# Patient Record
Sex: FEMALE | Race: WHITE | Employment: FULL TIME | URBAN - METROPOLITAN AREA
[De-identification: names, ages, dates, MRNs, and addresses within clinical notes are randomized per-mention and may not be internally consistent; named-entity substitution may affect disease eponyms.]

---

## 2017-04-17 RX ORDER — GABAPENTIN 100 MG/1
300 CAPSULE ORAL NIGHTLY
COMMUNITY
End: 2017-07-20 | Stop reason: ALTCHOICE

## 2017-04-17 RX ORDER — OMEGA-3 FATTY ACIDS/FISH OIL 300-1000MG
1 CAPSULE ORAL AS NEEDED
Status: ON HOLD | COMMUNITY
End: 2017-05-04

## 2017-05-03 NOTE — H&P
[address]    PATIENT'S NAME: Domi Medrano   ATTENDING PHYSICIAN: Levy Gonzalez M.D.    PATIENT ACCOUNT#:   [de-identified]    LOCATION:  OR   [facility]  MEDICAL RECORD #:   [id number]       YOB: 1980  ADMISSION DATE:       05/04/2017    HISTOR

## 2017-05-04 ENCOUNTER — ANESTHESIA (OUTPATIENT)
Dept: SURGERY | Facility: HOSPITAL | Age: 37
End: 2017-05-04

## 2017-05-04 ENCOUNTER — HOSPITAL ENCOUNTER (OUTPATIENT)
Facility: HOSPITAL | Age: 37
Setting detail: HOSPITAL OUTPATIENT SURGERY
Discharge: HOME | End: 2017-05-04
Attending: OTOLARYNGOLOGY | Admitting: OTOLARYNGOLOGY
Payer: COMMERCIAL

## 2017-05-04 ENCOUNTER — SURGERY (OUTPATIENT)
Age: 37
End: 2017-05-04

## 2017-05-04 ENCOUNTER — ANESTHESIA EVENT (OUTPATIENT)
Dept: SURGERY | Facility: HOSPITAL | Age: 37
End: 2017-05-04

## 2017-05-04 VITALS
SYSTOLIC BLOOD PRESSURE: 127 MMHG | OXYGEN SATURATION: 95 % | HEART RATE: 59 BPM | WEIGHT: 150.13 LBS | RESPIRATION RATE: 16 BRPM | DIASTOLIC BLOOD PRESSURE: 78 MMHG | TEMPERATURE: 98 F | HEIGHT: 65 IN | BODY MASS INDEX: 25.01 KG/M2

## 2017-05-04 PROCEDURE — 81025 URINE PREGNANCY TEST: CPT | Performed by: OTOLARYNGOLOGY

## 2017-05-04 PROCEDURE — 0CTPXZZ RESECTION OF TONSILS, EXTERNAL APPROACH: ICD-10-PCS | Performed by: OTOLARYNGOLOGY

## 2017-05-04 PROCEDURE — 88304 TISSUE EXAM BY PATHOLOGIST: CPT | Performed by: OTOLARYNGOLOGY

## 2017-05-04 RX ORDER — ONDANSETRON 4 MG/1
4 TABLET, ORALLY DISINTEGRATING ORAL EVERY 6 HOURS PRN
Status: CANCELLED | OUTPATIENT
Start: 2017-05-04

## 2017-05-04 RX ORDER — METOCLOPRAMIDE HYDROCHLORIDE 5 MG/ML
10 INJECTION INTRAMUSCULAR; INTRAVENOUS AS NEEDED
Status: DISCONTINUED | OUTPATIENT
Start: 2017-05-04 | End: 2017-05-04

## 2017-05-04 RX ORDER — ONDANSETRON 4 MG/1
4 TABLET, ORALLY DISINTEGRATING ORAL EVERY 6 HOURS PRN
Qty: 10 TABLET | Refills: 2 | Status: SHIPPED | OUTPATIENT
Start: 2017-05-04 | End: 2017-07-20 | Stop reason: ALTCHOICE

## 2017-05-04 RX ORDER — ONDANSETRON 2 MG/ML
4 INJECTION INTRAMUSCULAR; INTRAVENOUS AS NEEDED
Status: DISCONTINUED | OUTPATIENT
Start: 2017-05-04 | End: 2017-05-04

## 2017-05-04 RX ORDER — NALOXONE HYDROCHLORIDE 0.4 MG/ML
80 INJECTION, SOLUTION INTRAMUSCULAR; INTRAVENOUS; SUBCUTANEOUS AS NEEDED
Status: DISCONTINUED | OUTPATIENT
Start: 2017-05-04 | End: 2017-05-04

## 2017-05-04 RX ORDER — MEPERIDINE HYDROCHLORIDE 25 MG/ML
12.5 INJECTION INTRAMUSCULAR; INTRAVENOUS; SUBCUTANEOUS AS NEEDED
Status: DISCONTINUED | OUTPATIENT
Start: 2017-05-04 | End: 2017-05-04

## 2017-05-04 RX ORDER — DEXAMETHASONE SODIUM PHOSPHATE 4 MG/ML
4 VIAL (ML) INJECTION AS NEEDED
Status: DISCONTINUED | OUTPATIENT
Start: 2017-05-04 | End: 2017-05-04

## 2017-05-04 RX ORDER — HYDROCODONE BITARTRATE AND ACETAMINOPHEN 10; 325 MG/15ML; MG/15ML
10 SOLUTION ORAL EVERY 4 HOURS PRN
Qty: 473 ML | Refills: 0 | Status: SHIPPED | OUTPATIENT
Start: 2017-05-04 | End: 2017-05-11

## 2017-05-04 RX ORDER — SCOLOPAMINE TRANSDERMAL SYSTEM 1 MG/1
1 PATCH, EXTENDED RELEASE TRANSDERMAL ONCE
Status: DISCONTINUED | OUTPATIENT
Start: 2017-05-04 | End: 2017-05-04

## 2017-05-04 RX ORDER — SODIUM CHLORIDE, SODIUM LACTATE, POTASSIUM CHLORIDE, CALCIUM CHLORIDE 600; 310; 30; 20 MG/100ML; MG/100ML; MG/100ML; MG/100ML
INJECTION, SOLUTION INTRAVENOUS CONTINUOUS
Status: DISCONTINUED | OUTPATIENT
Start: 2017-05-04 | End: 2017-05-04

## 2017-05-04 RX ORDER — BUPIVACAINE HYDROCHLORIDE AND EPINEPHRINE 5; 5 MG/ML; UG/ML
INJECTION, SOLUTION EPIDURAL; INTRACAUDAL; PERINEURAL AS NEEDED
Status: DISCONTINUED | OUTPATIENT
Start: 2017-05-04 | End: 2017-05-04 | Stop reason: HOSPADM

## 2017-05-04 RX ORDER — HYDROMORPHONE HYDROCHLORIDE 1 MG/ML
INJECTION, SOLUTION INTRAMUSCULAR; INTRAVENOUS; SUBCUTANEOUS
Status: COMPLETED
Start: 2017-05-04 | End: 2017-05-04

## 2017-05-04 RX ORDER — HYDROMORPHONE HYDROCHLORIDE 1 MG/ML
0.4 INJECTION, SOLUTION INTRAMUSCULAR; INTRAVENOUS; SUBCUTANEOUS EVERY 5 MIN PRN
Status: DISCONTINUED | OUTPATIENT
Start: 2017-05-04 | End: 2017-05-04

## 2017-05-04 NOTE — ANESTHESIA POSTPROCEDURE EVALUATION
[address] Patient Status:  Hospital Outpatient Surgery   Age/Gender 40year old female MRN [id number]   Location [facility] Attending Mellisa Hylton MD   Hosp Day # 0 PCP No primary care provider on ajn

## 2017-05-04 NOTE — OPERATIVE REPORT
[address]    PATIENT'S NAME: Wilner Maier   ATTENDING PHYSICIAN: Cherylene Petri, M.D. OPERATING PHYSICIAN: Cherylene Petri, M.D.    PATIENT ACCOUNT#:   [de-identified]    LOCATION:  PREOPASCC  PRE ASCC 10 EDWP 10  MEDICAL RECORD #:   [id number]       DATE Jerel Ruby

## 2017-05-04 NOTE — ANESTHESIA PREPROCEDURE EVALUATION
PRE-OP EVALUATION    Patient Name: Beth Barahona    Pre-op Diagnosis: CHRONIC TONSILITIS, TONSIL HYPERTROPHY    Procedure(s):  BILATERAL TONSILLECTOMY    Surgeon(s) and Role:     Dipika Stout MD - Primary    Pre-op vitals reviewed.         Body mass in

## 2017-05-04 NOTE — BRIEF OP NOTE
Pre-Operative Diagnosis: CHRONIC TONSILITIS, TONSIL HYPERTROPHY     Post-Operative Diagnosis: CHRONIC TONSILITIS, TONSIL HYPERTROPHY     Procedure Performed:   Procedure(s):  BILATERAL TONSILLECTOMY    Surgeon(s) and Role:     Beth Mendoza MD - Sylvester Resendiz

## 2017-07-20 ENCOUNTER — OFFICE VISIT (OUTPATIENT)
Dept: FAMILY MEDICINE CLINIC | Facility: CLINIC | Age: 37
End: 2017-07-20

## 2017-07-20 VITALS
OXYGEN SATURATION: 98 % | SYSTOLIC BLOOD PRESSURE: 108 MMHG | DIASTOLIC BLOOD PRESSURE: 64 MMHG | HEART RATE: 80 BPM | RESPIRATION RATE: 18 BRPM | TEMPERATURE: 98 F

## 2017-07-20 DIAGNOSIS — L74.0 HEAT RASH: Primary | ICD-10-CM

## 2017-07-20 DIAGNOSIS — L73.9 FOLLICULITIS: ICD-10-CM

## 2017-07-20 PROCEDURE — 99202 OFFICE O/P NEW SF 15 MIN: CPT | Performed by: NURSE PRACTITIONER

## 2017-07-20 RX ORDER — MUPIROCIN CALCIUM 20 MG/G
1 CREAM TOPICAL DAILY
Qty: 15 G | Refills: 1 | Status: SHIPPED | OUTPATIENT
Start: 2017-07-20 | End: 2017-08-03

## 2017-07-20 RX ORDER — METHYLPREDNISOLONE 4 MG/1
TABLET ORAL
Qty: 1 KIT | Refills: 0 | Status: SHIPPED | OUTPATIENT
Start: 2017-07-20 | End: 2018-09-20 | Stop reason: ALTCHOICE

## 2017-07-20 NOTE — PROGRESS NOTES
CHIEF COMPLAINT:   Patient presents with:  Rash: heat rash         HPI:   Farzaneh Victoria is a 40year old female who presents for evaluation of a rash. Per patient rash started in the past 2 months.  Reports is a heat rash, works in [employer] and sweats MUSC/SKEL: Denies joint swelling or joint stiffness. GI: Denies abdominal pain, N/V/C/D. NEURO: Denies abnormal sensation, tingling of the skin, or numbness.       EXAM:   /64   Pulse 80   Temp 98.2 °F (36.8 °C) (Oral)   Resp 18   SpO2 98%   GENERAL Folliculitis is an infection of a hair follicle. A hair follicle is the little pocket where a hair grows out of the skin. Bacteria normally live on the skin. But sometimes bacteria can get trapped in a follicle and cause inflammation.  This causes a bumpy r · Change sheets and blankets if they are soiled by pus. Wash all clothes, towels, sheets, and cloth diapers in soap and hot water. Do not share clothes, towels, or sheets with other family members. · Do not soak the sores in bath water.  This can spread in · Dry skin, which is often seen during the winter months and in older people  How can I control itching and skin damage? · Take soothing lukewarm baths in a colloidal oatmeal product. You can buy this at the drugstore.   · Do your best not to scratch. Clip · Rash covers your face, genitals, or most of your body  · Crusty sores or red rings that begin to spread  · You were exposed to someone who has a contagious rash, such as scabies or lice.   · Red bull’s-eye rash with a white center (a sign of Lyme disease)

## 2017-07-20 NOTE — PATIENT INSTRUCTIONS
Folliculitis  Folliculitis is an infection of a hair follicle. A hair follicle is the little pocket where a hair grows out of the skin. Bacteria normally live on the skin. But sometimes bacteria can get trapped in a follicle and cause inflammation.  This · Change sheets and blankets if they are soiled by pus. Wash all clothes, towels, sheets, and cloth diapers in soap and hot water. Do not share clothes, towels, or sheets with other family members. · Do not soak the sores in bath water.  This can spread in · Dry skin, which is often seen during the winter months and in older people  How can I control itching and skin damage? · Take soothing lukewarm baths in a colloidal oatmeal product. You can buy this at the drugstore.   · Do your best not to scratch. Clip · Rash covers your face, genitals, or most of your body  · Crusty sores or red rings that begin to spread  · You were exposed to someone who has a contagious rash, such as scabies or lice.   · Red bull’s-eye rash with a white center (a sign of Lyme disease)

## 2018-05-19 ENCOUNTER — APPOINTMENT (OUTPATIENT)
Dept: GENERAL RADIOLOGY | Age: 38
End: 2018-05-19
Attending: NURSE PRACTITIONER
Payer: COMMERCIAL

## 2018-05-19 ENCOUNTER — HOSPITAL ENCOUNTER (OUTPATIENT)
Age: 38
Discharge: HOME | End: 2018-05-19
Payer: COMMERCIAL

## 2018-05-19 VITALS
WEIGHT: 130 LBS | BODY MASS INDEX: 21.66 KG/M2 | SYSTOLIC BLOOD PRESSURE: 123 MMHG | TEMPERATURE: 98 F | HEART RATE: 77 BPM | DIASTOLIC BLOOD PRESSURE: 80 MMHG | RESPIRATION RATE: 18 BRPM | HEIGHT: 65 IN | OXYGEN SATURATION: 99 %

## 2018-05-19 DIAGNOSIS — M79.672 LEFT FOOT PAIN: Primary | ICD-10-CM

## 2018-05-19 PROCEDURE — 99213 OFFICE O/P EST LOW 20 MIN: CPT

## 2018-05-19 PROCEDURE — 99203 OFFICE O/P NEW LOW 30 MIN: CPT

## 2018-05-19 PROCEDURE — 73630 X-RAY EXAM OF FOOT: CPT | Performed by: NURSE PRACTITIONER

## 2018-05-19 NOTE — ED INITIAL ASSESSMENT (HPI)
Patient is not sure what happened but she has had pain on and off all week to the top of her left foot. Starting last night the pain got much worse.

## 2018-05-19 NOTE — ED NOTES
Patient states pain is a 10. She is smiling and laughing with visitor in the room. Patient declined medication for pain. She was given an ice pack following xray.

## 2018-05-19 NOTE — ED PROVIDER NOTES
Patient Seen in: [address] [facility]    History   Patient presents with: Foot Pain    Stated Complaint: left foot pain    16-year-old female presents today with complaints of left foot pain.   States does not remember any particular injuries distress. Neck: Normal range of motion. Pulmonary/Chest: Effort normal.   Musculoskeletal:   Pain with palpation to the dorsal aspect of the left foot. No gross deformity swelling noted. Skin warm dry normal color and intact.   Pulses are palpable cap degenerative spurring a post avulsion fracture to the first metatarsal and first cuneiform. Will place an Ace wrap and give crutches and have follow-up with orthopedics. Patient verbalized understanding agree with plan of care.   Rice instructions were gi

## 2018-09-20 ENCOUNTER — OFFICE VISIT (OUTPATIENT)
Dept: FAMILY MEDICINE CLINIC | Facility: CLINIC | Age: 38
End: 2018-09-20
Payer: COMMERCIAL

## 2018-09-20 VITALS
WEIGHT: 135 LBS | DIASTOLIC BLOOD PRESSURE: 70 MMHG | TEMPERATURE: 99 F | HEIGHT: 63.5 IN | HEART RATE: 82 BPM | BODY MASS INDEX: 23.62 KG/M2 | SYSTOLIC BLOOD PRESSURE: 108 MMHG | RESPIRATION RATE: 16 BRPM

## 2018-09-20 DIAGNOSIS — G43.109 MIGRAINE WITH AURA AND WITHOUT STATUS MIGRAINOSUS, NOT INTRACTABLE: Primary | ICD-10-CM

## 2018-09-20 PROCEDURE — 99203 OFFICE O/P NEW LOW 30 MIN: CPT | Performed by: FAMILY MEDICINE

## 2018-09-20 RX ORDER — SUMATRIPTAN 50 MG/1
50 TABLET, FILM COATED ORAL EVERY 2 HOUR PRN
Qty: 10 TABLET | Refills: 0 | Status: SHIPPED | OUTPATIENT
Start: 2018-09-20 | End: 2020-10-15 | Stop reason: ALTCHOICE

## 2018-09-20 NOTE — PROGRESS NOTES
Anne Salgado is a 41year old female. Patient presents with:  Establish Care: migraines per pt      HPI:   Issues with migraines lately. Was seen at [facility]. She was given something IV and some morphine and that helped.  She hasn't had 108/64  05/04/17 : 127/78      Wt Readings from Last 6 Encounters:  09/20/18 : 135 lb  05/19/18 : 130 lb  05/04/17 : 150 lb 2.1 oz      REVIEW OF SYSTEMS:   GENERAL HEALTH: feels well no complaints other than above   SKIN: denies any unusual skin lesions o understanding of these issues and agrees to the plan.

## 2019-07-11 ENCOUNTER — OFFICE VISIT (OUTPATIENT)
Dept: FAMILY MEDICINE CLINIC | Facility: CLINIC | Age: 39
End: 2019-07-11
Payer: COMMERCIAL

## 2019-07-11 VITALS
HEART RATE: 68 BPM | BODY MASS INDEX: 24.79 KG/M2 | SYSTOLIC BLOOD PRESSURE: 112 MMHG | TEMPERATURE: 99 F | DIASTOLIC BLOOD PRESSURE: 76 MMHG | HEIGHT: 64.5 IN | RESPIRATION RATE: 16 BRPM | WEIGHT: 147 LBS

## 2019-07-11 DIAGNOSIS — T63.301A SPIDER BITE WOUND, ACCIDENTAL OR UNINTENTIONAL, INITIAL ENCOUNTER: Primary | ICD-10-CM

## 2019-07-11 PROCEDURE — 99213 OFFICE O/P EST LOW 20 MIN: CPT | Performed by: FAMILY MEDICINE

## 2019-07-11 NOTE — PROGRESS NOTES
Elizabeth Zimmerman is a 46year old female. Patient presents with:  Bite: on right arm      HPI:   Woke up with a bug bite on Monday. It has not been painful, just red and swollen. Is is a little itchy. No fevers. It has been draining clear fluid, no pus. apparent distress  SKIN: no rashes,no suspicious lesions other than an insect bite on the right forearm with 1 in surrounding erythema without edema or tenderness or warmth   HEENT: atraumatic, normocephalic,ears and throat are clear  NECK: supple,no adeno

## 2020-05-11 ENCOUNTER — VIRTUAL PHONE E/M (OUTPATIENT)
Dept: FAMILY MEDICINE CLINIC | Facility: CLINIC | Age: 40
End: 2020-05-11
Payer: COMMERCIAL

## 2020-05-11 ENCOUNTER — TELEPHONE (OUTPATIENT)
Dept: FAMILY MEDICINE CLINIC | Facility: CLINIC | Age: 40
End: 2020-05-11

## 2020-05-11 DIAGNOSIS — H10.503 BLEPHAROCONJUNCTIVITIS OF BOTH EYES, UNSPECIFIED BLEPHAROCONJUNCTIVITIS TYPE: Primary | ICD-10-CM

## 2020-05-11 PROCEDURE — 99213 OFFICE O/P EST LOW 20 MIN: CPT | Performed by: FAMILY MEDICINE

## 2020-05-11 RX ORDER — CIPROFLOXACIN HYDROCHLORIDE 3.5 MG/ML
1 SOLUTION/ DROPS TOPICAL
Qty: 10 ML | Refills: 0 | Status: SHIPPED | OUTPATIENT
Start: 2020-05-11 | End: 2020-10-15 | Stop reason: ALTCHOICE

## 2020-05-11 NOTE — PROGRESS NOTES
Virtual Telephone Check-In    Roselyn Burnette verbally consents to a Virtual/Telephone Check-In visit on 05/11/20. Patient understands and accepts financial responsibility for any deductible, co-insurance and/or co-pays associated with this service.

## 2020-05-11 NOTE — TELEPHONE ENCOUNTER
Idania Mustafa verbally {consents to a [employer] on 05/11/20.   Patient understands and accepts financial responsibility for any deductible, co-insurance and/or co-pays associated with this service

## 2020-07-31 ENCOUNTER — MED REC SCAN ONLY (OUTPATIENT)
Dept: FAMILY MEDICINE CLINIC | Facility: CLINIC | Age: 40
End: 2020-07-31

## 2020-10-15 ENCOUNTER — OFFICE VISIT (OUTPATIENT)
Dept: FAMILY MEDICINE CLINIC | Facility: CLINIC | Age: 40
End: 2020-10-15
Payer: COMMERCIAL

## 2020-10-15 ENCOUNTER — TELEPHONE (OUTPATIENT)
Dept: FAMILY MEDICINE CLINIC | Facility: CLINIC | Age: 40
End: 2020-10-15

## 2020-10-15 VITALS
HEIGHT: 64.5 IN | OXYGEN SATURATION: 99 % | WEIGHT: 154 LBS | BODY MASS INDEX: 25.97 KG/M2 | SYSTOLIC BLOOD PRESSURE: 124 MMHG | DIASTOLIC BLOOD PRESSURE: 86 MMHG | TEMPERATURE: 98 F | RESPIRATION RATE: 16 BRPM | HEART RATE: 81 BPM

## 2020-10-15 DIAGNOSIS — R22.31 LOCALIZED SWELLING ON RIGHT HAND: ICD-10-CM

## 2020-10-15 DIAGNOSIS — G56.01 CARPAL TUNNEL SYNDROME OF RIGHT WRIST: Primary | ICD-10-CM

## 2020-10-15 PROCEDURE — 3008F BODY MASS INDEX DOCD: CPT | Performed by: FAMILY MEDICINE

## 2020-10-15 PROCEDURE — 3079F DIAST BP 80-89 MM HG: CPT | Performed by: FAMILY MEDICINE

## 2020-10-15 PROCEDURE — 3074F SYST BP LT 130 MM HG: CPT | Performed by: FAMILY MEDICINE

## 2020-10-15 PROCEDURE — 99214 OFFICE O/P EST MOD 30 MIN: CPT | Performed by: FAMILY MEDICINE

## 2020-10-15 RX ORDER — NAPROXEN 500 MG/1
500 TABLET ORAL 2 TIMES DAILY WITH MEALS
Qty: 28 TABLET | Refills: 0 | Status: SHIPPED | OUTPATIENT
Start: 2020-10-15 | End: 2020-10-29

## 2020-10-15 NOTE — TELEPHONE ENCOUNTER
States at the wrist it feels like she is grinding bones. Does repetitive movement at work.   No injury  No redness   Just pain in wrist and swelling in her finger tips    Future Appointments   Date Time Provider Wilman Steven   10/15/2020  2:45 PM Glenn

## 2020-10-15 NOTE — TELEPHONE ENCOUNTER
Patient is calling because her right hand is swollen. She starting having pain last night and woke up swollen does not remember doing anything to it her wrist is very painful.  Please call

## 2020-10-15 NOTE — PROGRESS NOTES
Kirby Varner is a 45year old female. Patient presents with:  Wrist Pain: and finger swelling, no injuries      HPI:   Started with pain in wrist last night. After her shower this AM, her hand was swollen.    This has happened before about 4-5 yrs ago heartburn  NEURO: denies headaches    EXAM:   /86   Pulse 81   Temp 97.7 °F (36.5 °C) (Temporal)   Resp 16   Ht 64.5\"   Wt 154 lb (69.9 kg)   SpO2 99%   BMI 26.03 kg/m²  Body mass index is 26.03 kg/m².       GENERAL: well developed, well nourished,in

## 2021-01-13 ENCOUNTER — OFFICE VISIT (OUTPATIENT)
Dept: FAMILY MEDICINE CLINIC | Facility: CLINIC | Age: 41
End: 2021-01-13
Payer: COMMERCIAL

## 2021-01-13 VITALS
TEMPERATURE: 97 F | DIASTOLIC BLOOD PRESSURE: 80 MMHG | WEIGHT: 156.81 LBS | HEART RATE: 80 BPM | OXYGEN SATURATION: 98 % | SYSTOLIC BLOOD PRESSURE: 100 MMHG | BODY MASS INDEX: 27 KG/M2

## 2021-01-13 DIAGNOSIS — M54.50 ACUTE LEFT-SIDED LOW BACK PAIN WITHOUT SCIATICA: ICD-10-CM

## 2021-01-13 DIAGNOSIS — S39.012A LUMBAR STRAIN, INITIAL ENCOUNTER: Primary | ICD-10-CM

## 2021-01-13 PROCEDURE — 3079F DIAST BP 80-89 MM HG: CPT | Performed by: FAMILY MEDICINE

## 2021-01-13 PROCEDURE — 99214 OFFICE O/P EST MOD 30 MIN: CPT | Performed by: FAMILY MEDICINE

## 2021-01-13 PROCEDURE — 3074F SYST BP LT 130 MM HG: CPT | Performed by: FAMILY MEDICINE

## 2021-01-13 RX ORDER — CYCLOBENZAPRINE HCL 10 MG
10 TABLET ORAL 3 TIMES DAILY
Qty: 15 TABLET | Refills: 0 | Status: SHIPPED | OUTPATIENT
Start: 2021-01-13 | End: 2021-01-18

## 2021-01-13 NOTE — PATIENT INSTRUCTIONS
Rx Muscle relaxant as prescribed - this medication can cause drowsiness and dizziness and should not be used if driving or operating heavy machinery. Take only at bedtime if you are busy in the morning.    OTC Motrin / Advil 600 mg every 8 hours with food · Sudden twisting, bending, or stretching from an accident, or accidental movement  · Poor posture  · Stretching or moving wrong, without noticing pain at the time  · Poor coordination, lack of regular exercise (check with your doctor about this)  · Spinal · You can start with ice, then switch to heat. Heat (hot shower, hot bath, or heating pad) reduces pain and works well for muscle spasms. Heat can be applied to the painful area for 20 minutes then remove it for 20 minutes.  Do this over a period of 60 to 9 Kiran last reviewed this educational content on 10/1/2019  © 7886-4520 [employer] [address]. [facility] [place] [facility] [place]. All rights reserved. This information is not intended as a substitute for professional medical care.  Always foll

## 2021-01-14 NOTE — PROGRESS NOTES
[address] [facility] Office Note  Chief Complaint:   Patient presents with:  Low Back Pain      HPI:   Elizabeth Frederick is a 37year old female who presents for evaluation and management of a chief complaint of  back pain. Low back pain.  Fozia Mariluz Medication Sig Dispense Refill   • cyclobenzaprine 10 MG Oral Tab Take 1 tablet (10 mg total) by mouth 3 (three) times daily for 5 days.  15 tablet 0      Counseling given: Not Answered       REVIEW OF SYSTEMS:     All other review of systems are negative driving or operating heavy machinery. Take only at bedtime if you are busy in the morning.    OTC Motrin / Advil 600 mg every 8 hours with food X 3-5 days   Warm compresses --> back/neck stretches as discussed -->  Icing at least 3 times per day  No lifting

## 2021-02-05 ENCOUNTER — MED REC SCAN ONLY (OUTPATIENT)
Dept: FAMILY MEDICINE CLINIC | Facility: CLINIC | Age: 41
End: 2021-02-05

## 2021-03-05 ENCOUNTER — MED REC SCAN ONLY (OUTPATIENT)
Dept: FAMILY MEDICINE CLINIC | Facility: CLINIC | Age: 41
End: 2021-03-05

## 2021-04-05 ENCOUNTER — TELEPHONE (OUTPATIENT)
Dept: FAMILY MEDICINE CLINIC | Facility: CLINIC | Age: 41
End: 2021-04-05

## 2021-09-10 ENCOUNTER — TELEPHONE (OUTPATIENT)
Dept: FAMILY MEDICINE CLINIC | Facility: CLINIC | Age: 41
End: 2021-09-10

## 2021-09-10 DIAGNOSIS — N30.01 ACUTE CYSTITIS WITH HEMATURIA: Primary | ICD-10-CM

## 2021-09-10 RX ORDER — NITROFURANTOIN 25; 75 MG/1; MG/1
100 CAPSULE ORAL 2 TIMES DAILY
Qty: 14 CAPSULE | Refills: 0 | Status: SHIPPED | OUTPATIENT
Start: 2021-09-10 | End: 2021-09-17

## 2021-09-10 NOTE — TELEPHONE ENCOUNTER
As far as blood in the urine, I would recheck in about 3-4 weeks after being treated making sure she is not on her period when we check. I did send in an antibiotic for the UTI so that does not get worse over the weekend.      As far as the back pain, I c

## 2021-09-10 NOTE — TELEPHONE ENCOUNTER
Patient states she was seen at [facility] in [place] 9/9/21. Was diagnosed with UTI and sciatica  Was given norco, cyclobenzaprine and steroid. No antibiotic given  States her symptoms are improving but wants to know when she should be seen for f/u.   States sh

## 2021-09-10 NOTE — TELEPHONE ENCOUNTER
Patient notified via detailed voicemail left at cell number (ok per  HIPAA consent)    Advised to call office if any questions.   Med sent to [facility]

## 2021-09-10 NOTE — TELEPHONE ENCOUNTER
Pt called went to ER on 9/8/2021 for pain when urinating and blood in urine pt stated was given medication to treat but has not helped at all would like to be seen in office if possible     Pt would be going to work in the next 30 min its okay to leave a v

## 2021-10-11 ENCOUNTER — OFFICE VISIT (OUTPATIENT)
Dept: FAMILY MEDICINE CLINIC | Facility: CLINIC | Age: 41
End: 2021-10-11
Payer: COMMERCIAL

## 2021-10-11 VITALS
WEIGHT: 159 LBS | DIASTOLIC BLOOD PRESSURE: 80 MMHG | SYSTOLIC BLOOD PRESSURE: 120 MMHG | OXYGEN SATURATION: 99 % | BODY MASS INDEX: 27.82 KG/M2 | TEMPERATURE: 98 F | HEIGHT: 63.5 IN | HEART RATE: 74 BPM

## 2021-10-11 DIAGNOSIS — Z87.440 HISTORY OF UTI: ICD-10-CM

## 2021-10-11 DIAGNOSIS — R30.0 DYSURIA: ICD-10-CM

## 2021-10-11 DIAGNOSIS — R31.21 ASYMPTOMATIC MICROSCOPIC HEMATURIA: ICD-10-CM

## 2021-10-11 DIAGNOSIS — M62.830 SPASM OF MUSCLE OF LOWER BACK: Primary | ICD-10-CM

## 2021-10-11 PROCEDURE — 3079F DIAST BP 80-89 MM HG: CPT | Performed by: FAMILY MEDICINE

## 2021-10-11 PROCEDURE — 3008F BODY MASS INDEX DOCD: CPT | Performed by: FAMILY MEDICINE

## 2021-10-11 PROCEDURE — 3074F SYST BP LT 130 MM HG: CPT | Performed by: FAMILY MEDICINE

## 2021-10-11 PROCEDURE — 81003 URINALYSIS AUTO W/O SCOPE: CPT | Performed by: FAMILY MEDICINE

## 2021-10-11 PROCEDURE — 99214 OFFICE O/P EST MOD 30 MIN: CPT | Performed by: FAMILY MEDICINE

## 2021-10-11 NOTE — PROGRESS NOTES
Luis Alberto Love is a 37year old female. Patient presents with:  UTI      HPI:   Went to ER for sciatic pain. That was about a month ago, she had pain over a day period that got progressively worse. She was in the ER 9/9/21.  Was given steriods, cycloben (72.1 kg)   [facility] 09/16/2021   SpO2 99%   BMI 27.72 kg/m²  Body mass index is 27.72 kg/m².       GENERAL: well developed, well nourished,in no apparent distress  SKIN: no rashes,no suspicious lesions  HEENT: atraumatic, normocephalic,ears and throat are clear

## 2021-10-12 ENCOUNTER — OFFICE VISIT (OUTPATIENT)
Dept: FAMILY MEDICINE CLINIC | Facility: CLINIC | Age: 41
End: 2021-10-12
Payer: COMMERCIAL

## 2021-10-12 VITALS
HEART RATE: 90 BPM | TEMPERATURE: 99 F | DIASTOLIC BLOOD PRESSURE: 86 MMHG | BODY MASS INDEX: 27.49 KG/M2 | HEIGHT: 64 IN | SYSTOLIC BLOOD PRESSURE: 126 MMHG | WEIGHT: 161 LBS | RESPIRATION RATE: 16 BRPM | OXYGEN SATURATION: 99 %

## 2021-10-12 DIAGNOSIS — Z00.00 HEALTHY ADULT ON ROUTINE PHYSICAL EXAMINATION: Primary | ICD-10-CM

## 2021-10-12 DIAGNOSIS — Z12.31 ENCOUNTER FOR SCREENING MAMMOGRAM FOR MALIGNANT NEOPLASM OF BREAST: ICD-10-CM

## 2021-10-12 DIAGNOSIS — Z23 NEED FOR VACCINATION: ICD-10-CM

## 2021-10-12 DIAGNOSIS — Z01.419 WELL WOMAN EXAM WITH ROUTINE GYNECOLOGICAL EXAM: ICD-10-CM

## 2021-10-12 PROCEDURE — 90715 TDAP VACCINE 7 YRS/> IM: CPT | Performed by: FAMILY MEDICINE

## 2021-10-12 PROCEDURE — 3079F DIAST BP 80-89 MM HG: CPT | Performed by: FAMILY MEDICINE

## 2021-10-12 PROCEDURE — 88175 CYTOPATH C/V AUTO FLUID REDO: CPT | Performed by: FAMILY MEDICINE

## 2021-10-12 PROCEDURE — 85025 COMPLETE CBC W/AUTO DIFF WBC: CPT | Performed by: FAMILY MEDICINE

## 2021-10-12 PROCEDURE — 87625 HPV TYPES 16 & 18 ONLY: CPT | Performed by: FAMILY MEDICINE

## 2021-10-12 PROCEDURE — 3008F BODY MASS INDEX DOCD: CPT | Performed by: FAMILY MEDICINE

## 2021-10-12 PROCEDURE — 87624 HPV HI-RISK TYP POOLED RSLT: CPT | Performed by: FAMILY MEDICINE

## 2021-10-12 PROCEDURE — 80061 LIPID PANEL: CPT | Performed by: FAMILY MEDICINE

## 2021-10-12 PROCEDURE — 99396 PREV VISIT EST AGE 40-64: CPT | Performed by: FAMILY MEDICINE

## 2021-10-12 PROCEDURE — 80053 COMPREHEN METABOLIC PANEL: CPT | Performed by: FAMILY MEDICINE

## 2021-10-12 PROCEDURE — 90471 IMMUNIZATION ADMIN: CPT | Performed by: FAMILY MEDICINE

## 2021-10-12 PROCEDURE — 3074F SYST BP LT 130 MM HG: CPT | Performed by: FAMILY MEDICINE

## 2021-10-12 NOTE — PROGRESS NOTES
HPI:   Roselyn Burnette is a 37year old female who presents for a complete physical exam. Symptoms: denies discharge, itching, burning or dysuria, periods are regular. Patient complains of nothing new today.      Concerned about hematuria, microscopic onl vision  HEENT: denies nasal congestion, sinus pain or ST  LUNGS: denies shortness of breath with exertion  CARDIOVASCULAR: denies chest pain on exertion  GI: denies abdominal pain,denies heartburn  : denies dysuria, vaginal discharge or itching,periods r plan.  The patient is asked to return for CPX in 1 yr or sooner if needed.      Healthy adult on routine physical examination  (primary encounter diagnosis)  Need for vaccination  Well woman exam with routine gynecological exam  Encounter for screening mamm

## 2021-10-19 ENCOUNTER — TELEPHONE (OUTPATIENT)
Dept: FAMILY MEDICINE CLINIC | Facility: CLINIC | Age: 41
End: 2021-10-19

## 2021-10-19 DIAGNOSIS — R87.610 ATYPICAL SQUAMOUS CELLS OF UNDETERMINED SIGNIFICANCE ON CYTOLOGIC SMEAR OF CERVIX (ASC-US): Primary | ICD-10-CM

## 2021-10-19 DIAGNOSIS — R87.810 CERVICAL HIGH RISK HPV (HUMAN PAPILLOMAVIRUS) TEST POSITIVE: ICD-10-CM

## 2021-10-19 NOTE — TELEPHONE ENCOUNTER
Attempted to call pt, but it kept ringing, no answer, no VM. Need to discuss pap results. Pap showed atypical cells and her HPV is positive.  This is not cervical cancer, but this can be something that eventually can lead to cervical cancer  She needs t

## 2021-10-21 NOTE — TELEPHONE ENCOUNTER
Attempted to contact patient but after 12 rings there is no answer or voicemail.     See also result note on other labs done 10/12/21

## 2021-11-11 ENCOUNTER — TELEPHONE (OUTPATIENT)
Dept: FAMILY MEDICINE CLINIC | Facility: CLINIC | Age: 41
End: 2021-11-11

## 2021-11-11 ENCOUNTER — NURSE ONLY (OUTPATIENT)
Dept: FAMILY MEDICINE CLINIC | Facility: CLINIC | Age: 41
End: 2021-11-11
Payer: COMMERCIAL

## 2021-11-11 DIAGNOSIS — R31.21 ASYMPTOMATIC MICROSCOPIC HEMATURIA: ICD-10-CM

## 2021-11-11 PROCEDURE — 81001 URINALYSIS AUTO W/SCOPE: CPT | Performed by: FAMILY MEDICINE

## 2021-11-11 NOTE — PROGRESS NOTES
Sam Wilkerson present in office for nurse visit. Pt v/u of clean catch   Urine sample obtained    All questions/concerns addressed. Patient left in stable condition.

## 2021-11-13 ENCOUNTER — TELEPHONE (OUTPATIENT)
Dept: FAMILY MEDICINE CLINIC | Facility: CLINIC | Age: 41
End: 2021-11-13

## 2021-11-13 DIAGNOSIS — N02.9 IDIOPATHIC HEMATURIA, UNSPECIFIED WHETHER GLOMERULAR MORPHOLOGIC CHANGES PRESENT: Primary | ICD-10-CM

## 2021-11-13 NOTE — TELEPHONE ENCOUNTER
----- Message from Suly New DO sent at 11/13/2021  8:14 AM CST -----  Was she symptomatic with this? Or did she just have visible blood in her urine?  Her urine did not warrant or meet criteria for UTI, it looks like she's had blood in her urine before

## 2021-12-04 ENCOUNTER — OFFICE VISIT (OUTPATIENT)
Dept: FAMILY MEDICINE CLINIC | Facility: CLINIC | Age: 41
End: 2021-12-04
Payer: COMMERCIAL

## 2021-12-04 VITALS
DIASTOLIC BLOOD PRESSURE: 84 MMHG | TEMPERATURE: 98 F | HEART RATE: 78 BPM | OXYGEN SATURATION: 99 % | BODY MASS INDEX: 27 KG/M2 | WEIGHT: 160 LBS | SYSTOLIC BLOOD PRESSURE: 130 MMHG

## 2021-12-04 DIAGNOSIS — F41.9 ANXIETY: ICD-10-CM

## 2021-12-04 DIAGNOSIS — G47.00 INSOMNIA, UNSPECIFIED TYPE: ICD-10-CM

## 2021-12-04 DIAGNOSIS — F32.A DEPRESSION, UNSPECIFIED DEPRESSION TYPE: Primary | ICD-10-CM

## 2021-12-04 PROCEDURE — 99213 OFFICE O/P EST LOW 20 MIN: CPT | Performed by: NURSE PRACTITIONER

## 2021-12-04 PROCEDURE — 3079F DIAST BP 80-89 MM HG: CPT | Performed by: NURSE PRACTITIONER

## 2021-12-04 PROCEDURE — 3075F SYST BP GE 130 - 139MM HG: CPT | Performed by: NURSE PRACTITIONER

## 2021-12-04 RX ORDER — HYDROXYZINE HYDROCHLORIDE 25 MG/1
25 TABLET, FILM COATED ORAL 2 TIMES DAILY PRN
Qty: 60 TABLET | Refills: 0 | Status: SHIPPED | OUTPATIENT
Start: 2021-12-04

## 2021-12-04 RX ORDER — ESCITALOPRAM OXALATE 5 MG/5ML
10 SOLUTION ORAL DAILY
Qty: 300 ML | Refills: 0 | Status: SHIPPED | OUTPATIENT
Start: 2021-12-04 | End: 2021-12-30 | Stop reason: DRUGHIGH

## 2021-12-04 NOTE — PROGRESS NOTES
Subjective:   Patient ID: Mook Castellanos is a 37year old female. Patient presents to clinic today for evaluation of depression anxiety. Notes she does not have history however recent events in her marriage have caused her great distress.   Reports s Anxiety. 60 tablet 0     Allergies:   Other                   SHORTNESS OF BREATH  Ceclor [Cefaclor]       HIVES  Erythromycin            HIVES  Keflex [Cephalexin]     HIVES  Penicillins             HIVES  Septra [Sulfamethox*    HIVES  Tetracycline Take 1 tablet (25 mg total) by mouth 2 (two) times daily as needed for Anxiety.        Imaging & Referrals:  OP REFERRAL TO [facility]

## 2021-12-04 NOTE — PATIENT INSTRUCTIONS
Begin taking lexapro 5 mg daily. 25-50mg hydroxyzine at night to help with sleep. Follow up with counselor   Follow up with the office in 30 days with a condition update, sooner if there are any concerns.

## 2021-12-30 RX ORDER — ESCITALOPRAM OXALATE 5 MG/5ML
SOLUTION ORAL
Qty: 300 ML | Refills: 0 | OUTPATIENT
Start: 2021-12-30

## 2021-12-30 RX ORDER — ESCITALOPRAM OXALATE 10 MG/1
10 TABLET ORAL DAILY
Qty: 90 TABLET | Refills: 0 | Status: SHIPPED | OUTPATIENT
Start: 2021-12-30

## 2021-12-30 RX ORDER — ESCITALOPRAM OXALATE 5 MG/5ML
10 SOLUTION ORAL DAILY
Qty: 300 ML | Refills: 0 | Status: CANCELLED | OUTPATIENT
Start: 2021-12-30 | End: 2022-01-29

## 2021-12-30 NOTE — TELEPHONE ENCOUNTER
No refill protocol for this medication. Last refill: 12/04/2021 #300 ML with 0 refills  Last Visit: 12/04/2021  Next Visit: No future appointments. Forward to BETSEY Kim please advise on refills.  Please verify pt should be taking liquid form of

## 2022-09-13 ENCOUNTER — APPOINTMENT (OUTPATIENT)
Dept: RHEUMATOLOGY | Age: 42
End: 2022-09-13

## 2023-04-04 ENCOUNTER — APPOINTMENT (OUTPATIENT)
Dept: RHEUMATOLOGY | Age: 43
End: 2023-04-04

## 2023-08-04 NOTE — PROGRESS NOTES
This is a telemedicine visit with live, interactive video and audio. Patient understands and accepts financial responsibility for any deductible, co-insurance and/or co-pays associated with this service. SUBJECTIVE  For the past week or two weeks, has been having pain in left arm. Feels like a pulled muscle. Nothing has been helping the pain. Today had a meeting at work, she looked in a mirror and she has welts on her arm. A nurse thought it might be cellulitits. Feels warm to the touch and is red. No fevers. It feels hard and raised, there are some blisters on its. Doesn't remember getting bit by a bug or spider. Arm feels heavy, the whole thing hurts. Pt has carpal tunnel surgery scheduled next Thursday AM with Dr. Alvin Hansen.      HISTORY:  Past Medical History:   Diagnosis Date    Anxiety     Visual impairment     glasses/contacts      Past Surgical History:   Procedure Laterality Date          OTHER      wisdom teeth extraction    TONSILLECTOMY  2017      Family History   Problem Relation Age of Onset    Cancer Maternal Grandmother         kidney    Cancer Maternal Grandfather         bone      Social History     Socioeconomic History    Marital status:    Tobacco Use    Smoking status: Former     Types: Cigarettes     Quit date: 2012     Years since quittin.3    Smokeless tobacco: Never   Vaping Use    Vaping Use: Never used   Substance and Sexual Activity    Alcohol use: No     Comment: very rare    Drug use: No   Social History Narrative    ** Merged History Encounter **               Erythromycin            HIVES  Keflex [Cephalexin]     HIVES  Other                   SHORTNESS OF BREATH  Penicillins             HIVES  Tetracycline            HIVES  Ceclor [Cefaclor]       HIVES  Erythromycin            HIVES  Keflex [Cephalexin]     HIVES  Penicillins             HIVES  Septra [Sulfamethox*    HIVES  Septra [Sulfamethox*    UNKNOWN  Tetracycline HIVES  Ceclor [Cefaclor]       NAUSEA ONLY   Current Outpatient Medications   Medication Sig Dispense Refill    clindamycin 300 MG Oral Cap Take 1 capsule (300 mg total) by mouth 3 (three) times daily for 10 days. 30 capsule 0    ESCITALOPRAM OXALATE OR Take by mouth.      escitalopram 10 MG Oral Tab Take 1 tablet (10 mg total) by mouth daily. 90 tablet 0    hydrOXYzine 25 MG Oral Tab Take 1 tablet (25 mg total) by mouth 2 (two) times daily as needed for Anxiety. 60 tablet 0       OBJECTIVE  Physical Exam:   alert, appears stated age, and cooperative, Normocephalic, without obvious abnormality, atraumatic, Speaking in full sentences comfortably, Normal work of breathing, and + right upper arm has red, raised large patches covering most of deltoid area that are tender and hot to the touch, surface is bumpy, no draining blisters. ASSESSMENT & PLAN  Diagnoses and all orders for this visit:    Cellulitis of left upper extremity  -     clindamycin 300 MG Oral Cap; Take 1 capsule (300 mg total) by mouth 3 (three) times daily for 10 days. Treat as above given allergies. Advised to call surgeon's office, will likely need to reschedule her carpal tunnel surgery. Advised to draw a line around the red area, if redness is spreading beyond that in the next 24-48 hrs, then she needs to go to ER. She understands and agrees. Christy Cervantes, DO    Please note that the following visit was completed using two-way, real-time interactive audio and/or video communication. This has been done in good [place] to provide continuity of care in the best interest of the provider-patient relationship, due to the ongoing public health crisis/national emergency and because of restrictions of visitation. There are limitations of this visit as no physical exam could be performed. Every conscious effort was taken to allow for sufficient and adequate time.   This billing was spent on reviewing labs, medications, radiology tests and decision making. Appropriate medical decision-making and tests are ordered as detailed in the plan of care above.

## 2023-08-17 NOTE — TELEPHONE ENCOUNTER
Confirmed with patient she needs date on note changed to 8/25/23. Pain is getting better and feels she should be able to return to work without restrictions by then.

## 2023-08-17 NOTE — TELEPHONE ENCOUNTER
Patient states she has a note for restrictions at work until 8/21/23  Is asking if it can be extended until 8/25/23.   States her daughter is currently in hospital.  Patient has f/u with KE 8/25/23    Will need note faxed to Onsite nurse Lorene Alvarado at [phone]

## 2023-08-17 NOTE — TELEPHONE ENCOUNTER
Franchesca Garnica needs a return to work note, but she needs it delayed until aug 25 because her daughter is in the hospital. Call pt.

## 2023-08-25 NOTE — TELEPHONE ENCOUNTER
Will Dr Vi Jackson sent a note to Dr aHleigh Sheikh office that she is cleared for surgery, please fax.

## 2023-08-25 NOTE — TELEPHONE ENCOUNTER
Letter has been faxed to [facility] at [phone]    Patient notified and verbalized understanding.

## 2023-09-05 NOTE — TELEPHONE ENCOUNTER
Per Dr Vanessa Kessler, [facility] will not be completed until Thursday    Attempted to contact the [facility] at [phone] but after holding for 22 minutes was unable to continue holding.     Will need to retry

## 2023-09-07 NOTE — TELEPHONE ENCOUNTER
Forms have been faxed to the [facility] to scan

## 2023-12-05 NOTE — TELEPHONE ENCOUNTER
Lab Frequency Next Occurrence   [facility] DEBBI 2D+3D SCREENING BILAT (CPT=77067/21966) Once 08/25/2023     Letter mailed to patient reminding them they have outstanding orders.

## 2023-12-21 NOTE — ED INITIAL ASSESSMENT (HPI)
Pt was seen at walk in clinic today and evaluated for uri symptoms. Pt had negative covid there.   Pt sent here for further eval

## 2023-12-22 NOTE — TELEPHONE ENCOUNTER
Pt has pneumonia and has to go back to work tomorrow and will need a note to return with no restrictions, no openings, please call pt.

## 2023-12-22 NOTE — TELEPHONE ENCOUNTER
Patient states she still feels like garbage but has to go back to work tomorrow. 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 No fevers. Still coughing and short of breath. States she feels a little better than she did yesterday.   States symptoms started last weekend    Covid and flu negative

## 2023-12-22 NOTE — TELEPHONE ENCOUNTER
I'll say she can go back tomorrow, but rest today. Hydrate. Wear a mask at work if coughing (I'm guessing they wear masks anyway). Note sent to jordan.

## 2024-02-21 NOTE — TELEPHONE ENCOUNTER
Routing to covering provider- please confirm that patient is okay to take Clindamycin based on her allergy list

## 2024-02-21 NOTE — TELEPHONE ENCOUNTER
PT CALLED AND ADV JUST GOT SOME TEETH PULLED - ADV WAS PRESCRIBED CLINAMYCIN.    WOULD LIKE US TO VERIFY W/HER ALLERGIES IF SHE IS ABLE TO TAKE THIS MEDICATION    PLEASE ADV    THANK YOU

## 2024-10-26 NOTE — PROGRESS NOTES
Alejandra Garner is a 44 year old female.  Chief Complaint   Patient presents with    Medication Follow-Up     Fluoxetine follow up       HPI:   Started fluoxetine 6 weeks ago.   She loves it.   Less crying, less panic. No more racing heart.   Sleeping better.   Feels normal. So much better.   She is happy with it.   Feels like a different person, in a good way.     Has some trouble getting motivated to move this weekend, but getting it done.   Off next week so she can move into a new apartment in [place] ([place]).     ALLERGIES:  Allergies[1]      Current Outpatient Medications   Medication Sig Dispense Refill    FLUoxetine 20 MG Oral Cap Take 1 capsule (20 mg total) by mouth daily. 90 capsule 1    nitrofurantoin monohydrate macro 100 MG Oral Cap TAKE 1 CAPSULE BY MOUTH IN THE MORNING AND 1 CAPSULE IN THE EVENING. DO ALL THIS FOR 5 DAYS (Patient not taking: Reported on 2024)        Past Medical History:    Anxiety    Arthritis    Visual impairment    glasses/contacts      Social History:  Social History     Socioeconomic History    Marital status:    Tobacco Use    Smoking status: Former     Current packs/day: 0.00     Types: Cigarettes     Quit date: 2012     Years since quittin.5    Smokeless tobacco: Never   Vaping Use    Vaping status: Never Used   Substance and Sexual Activity    Alcohol use: No     Comment: very rare    Drug use: No   Other Topics Concern    Caffeine Concern No    Exercise Yes    Seat Belt Yes    Special Diet No    Stress Concern No    Weight Concern No   Social History Narrative    ** Merged History Encounter **          Social Drivers of Health      [facility], [facility]    Social Connections    Received from [facility], [facility]    Housing Stability        BP Readings from Last 6 Encounters:   10/26/24 120/78   24 128/84   23 136/77   23  134/83   11/03/23 122/86   10/06/23 118/70       Wt Readings from Last 6 Encounters:   10/26/24 150 lb (68 kg)   09/09/24 154 lb 9.6 oz (70.1 kg)   12/21/23 160 lb (72.6 kg)   12/21/23 160 lb (72.6 kg)   11/03/23 162 lb 6.4 oz (73.7 kg)   10/06/23 161 lb 2 oz (73.1 kg)       REVIEW OF SYSTEMS:   GENERAL HEALTH: feels well no complaints other than above   SKIN: denies any unusual skin lesions or rashes  RESPIRATORY: denies shortness of breath    CARDIOVASCULAR: denies chest pain on exertion  GI: denies abdominal pain and denies heartburn  NEURO: denies headaches     EXAM:   /78   Pulse 84   Temp 98.2 °F (36.8 °C) (Temporal)   Resp 14   Ht 5' 5\" (1.651 m)   Wt 150 lb (68 kg)   LMP 10/10/2024 (Exact Date)   SpO2 98%   BMI 24.96 kg/m²  Body mass index is 24.96 kg/m².      GENERAL: well developed, well nourished,in no apparent distress  SKIN: no rashes,no suspicious lesions  HEENT: atraumatic, normocephalic   NECK: supple,no adenopathy,   LUNGS: clear to auscultation  CARDIO: RRR without murmur  GI: good BS's,no masses, HSM or tenderness  EXTREMITIES: no cyanosis, clubbing or edema    ASSESSMENT AND PLAN:     Encounter Diagnosis   Name Primary?    Anxiety        Diagnoses and all orders for this visit:    Anxiety  -     FLUoxetine 20 MG Oral Cap; Take 1 capsule (20 mg total) by mouth daily.    Doing great with this med, will continue.   Follow up in 4-6 months, sooner if needed.     No orders of the defined types were placed in this encounter.              Meds & Refills for this Visit:  Requested Prescriptions     Signed Prescriptions Disp Refills    FLUoxetine 20 MG Oral Cap 90 capsule 1     Sig: Take 1 capsule (20 mg total) by mouth daily.             The patient indicates understanding of these issues and agrees to the plan.               [1]   Allergies  Allergen Reactions    Erythromycin HIVES    Keflex [Cephalexin] HIVES    Morphine OTHER (SEE COMMENTS)    Other SHORTNESS OF BREATH and HIVES     Penicillin G HYPERKALEMIA    Tetracycline HIVES    Ceclor [Cefaclor] HIVES    Penicillins HIVES    Septra [Sulfamethoxazole W/Trimethoprim] HIVES    Ceclor [Cefaclor] NAUSEA ONLY    Sulfamethoxazole UNKNOWN    Trimethoprim UNKNOWN

## 2025-04-21 NOTE — PROGRESS NOTES
Alejandra Garner is a 45 year old female.  Chief Complaint   Patient presents with    Follow - Up     Medication follow up and would like labs        HPI:   Since February she's been tired. Work has been crazy busy.   Constantly tire.   Started taking iron, but not helping.   Not lightheaded or dizzy.   Sleeping good sometimes, but sometimes mind races.     Feeling down, wants to cry all day.   Fluoxetine 20 mg helps with anxiety, but still feel it. Not as strong as it once was.   Still going through a divorce, which has been hard.   Lost another 5 lbs since last fall.   Lives on her own, sees daughter occasionally.     ALLERGIES:  Allergies[1]      Current Medications[2]   Past Medical History[3]   Social History:  Short Social Hx on File[4]     BP Readings from Last 6 Encounters:   04/21/25 136/80   10/26/24 120/78   09/09/24 128/84   12/21/23 136/77   12/21/23 134/83   11/03/23 122/86       Wt Readings from Last 6 Encounters:   04/21/25 145 lb 6.4 oz (66 kg)   10/26/24 150 lb (68 kg)   09/09/24 154 lb 9.6 oz (70.1 kg)   12/21/23 160 lb (72.6 kg)   12/21/23 160 lb (72.6 kg)   11/03/23 162 lb 6.4 oz (73.7 kg)       REVIEW OF SYSTEMS:   GENERAL HEALTH: feels well no complaints other than above   SKIN: denies any unusual skin lesions or rashes  RESPIRATORY: denies shortness of breath    CARDIOVASCULAR: denies chest pain   GI: denies abdominal pain and denies heartburn  NEURO: denies headaches    EXAM:   /80   Pulse 82   Temp 97.3 °F (36.3 °C) (Temporal)   Resp 20   Wt 145 lb 6.4 oz (66 kg)   LMP 04/17/2025 (Exact Date)   SpO2 97%   BMI 24.20 kg/m²  Body mass index is 24.2 kg/m².      GENERAL: well developed, well nourished,in no apparent distress  SKIN: no rashes,no suspicious lesions  HEENT: atraumatic, normocephalic,ears and throat are clear  NECK: supple,no adenopathy,   LUNGS: clear to auscultation, no rales or wheezing   CARDIO: RRR without murmur  GI: good BS's,no masses, HSM or  tenderness  EXTREMITIES: no cyanosis, clubbing or edema  Psych: normal affect     ASSESSMENT AND PLAN:     Encounter Diagnoses   Name Primary?    Fatigue, unspecified type Yes    Preventative health care     Anxiety     Encounter for screening mammogram for malignant neoplasm of breast        Diagnoses and all orders for this visit:    Fatigue, unspecified type  -     TSH W Reflex To Free T4; Future  -     Vitamin D; Future  -     Ferritin; Future  -     Iron And Tibc; Future  -     VENIPUNCTURE    Preventative health care  -     CBC With Differential With Platelet; Future  -     Comp Metabolic Panel (14); Future  -     Lipid Panel; Future    Anxiety  -     FLUoxetine HCl 40 MG Oral Cap; Take 1 capsule (40 mg total) by mouth daily.    Encounter for screening mammogram for malignant neoplasm of breast  -     [facility] 2D+3D SCREENING BILAT (CPT=77067/44214); Future    Increase fluoxetine to 40 mg.   Check labs as ordered.   [facility] ordered as well.     Orders Placed This Encounter   Procedures    CBC With Differential With Platelet     Standing Status:   Future     Number of Occurrences:   1     Expected Date:   4/21/2025     Expiration Date:   4/22/2026    Comp Metabolic Panel (14)     Standing Status:   Future     Number of Occurrences:   1     Expected Date:   4/21/2025     Expiration Date:   4/22/2026     [facility]: Has the patient fasted?:   Yes    Lipid Panel     Standing Status:   Future     Number of Occurrences:   1     Expected Date:   4/21/2025     Expiration Date:   4/21/2026     [facility]: Has the patient fasted?:   Yes    TSH W Reflex To Free T4     Standing Status:   Future     Number of Occurrences:   1     Expected Date:   4/21/2025     Expiration Date:   4/22/2026    Vitamin D     Standing Status:   Future     Number of Occurrences:   1     Expected Date:   4/21/2025     Expiration Date:   4/21/2026     Please pick the scenario that best fits the purpose for ordering this test:   General Screening/Vit D  deficiency (25-Hydroxy)     Release to patient:   Immediate    Ferritin     Standing Status:   Future     Number of Occurrences:   1     Expected Date:   2025     Expiration Date:   2026    Iron And Tibc     Standing Status:   Future     Number of Occurrences:   1     Expected Date:   2025     Expiration Date:   2026     Release to patient:   Immediate    Vitamin D, 25-Hydroxy     Standing Status:   Future     Number of Occurrences:   1     Expected Date:   2025     Expiration Date:   2026     Release to patient:   Immediate    VENIPUNCTURE     Release to patient:   Immediate               Meds & Refills for this Visit:  Requested Prescriptions     Signed Prescriptions Disp Refills    FLUoxetine HCl 40 MG Oral Cap 90 capsule 0     Sig: Take 1 capsule (40 mg total) by mouth daily.             The patient indicates understanding of these issues and agrees to the plan.               [1]   Allergies  Allergen Reactions    Erythromycin HIVES    Keflex [Cephalexin] HIVES    Morphine OTHER (SEE COMMENTS)    Other SHORTNESS OF BREATH and HIVES    Penicillin G HYPERKALEMIA    Tetracycline HIVES    Ceclor [Cefaclor] HIVES    Penicillins HIVES    Septra [Sulfamethoxazole W/Trimethoprim] HIVES    Ceclor [Cefaclor] NAUSEA ONLY    Sulfamethoxazole UNKNOWN    Trimethoprim UNKNOWN   [2]   Current Outpatient Medications   Medication Sig Dispense Refill    FLUoxetine HCl 40 MG Oral Cap Take 1 capsule (40 mg total) by mouth daily. 90 capsule 0   [3]   Past Medical History:   Anxiety    Arthritis    Visual impairment    glasses/contacts   [4]   Social History  Socioeconomic History    Marital status: Unknown   Tobacco Use    Smoking status: Former     Current packs/day: 0.00     Types: Cigarettes     Quit date: 2012     Years since quittin.0    Smokeless tobacco: Never   Vaping Use    Vaping status: Never Used   Substance and Sexual Activity    Alcohol use: No     Comment: very rare    Drug use:  No   Other Topics Concern    Caffeine Concern No    Exercise Yes    Seat Belt Yes    Special Diet No    Stress Concern No    Weight Concern No   Social History Narrative    ** Merged History Encounter **          Social Drivers of Health     Food Insecurity: No Food Insecurity (4/21/2025)    NCSS - Food Insecurity     Worried About Running Out of Food in the Last Year: No     Ran Out of Food in the Last Year: No   Transportation Needs: No Transportation Needs (4/21/2025)    NCSS - Transportation     Lack of Transportation: No   Housing Stability: Not At Risk (4/21/2025)    NCSS - Housing/Utilities     Has Housing: Yes     Worried About Losing Housing: No     Unable to Get Utilities: No

## 2025-05-30 NOTE — TELEPHONE ENCOUNTER
Lab Frequency Next Occurrence   [facility] 2D+3D SCREENING BILAT (CPT=77067/14472) Once 04/21/2025   Ferritin Once 07/21/2025   Iron And Tibc Once 07/21/2025   Vitamin D Once 07/21/2025     Letter mailed to patient reminding them they have outstanding orders.

## 2025-06-10 NOTE — ED INITIAL ASSESSMENT (HPI)
Pt sts last night tripped over own feet twice. Pain to left ankle Denies other injuries. Unable to fully weight bear.

## 2025-06-10 NOTE — ED PROVIDER NOTES
Patient Seen in: [facility]        History  Chief Complaint   Patient presents with    Leg or Foot Injury     Stated Complaint: Left ankle pain    Subjective:   The history is provided by the patient.               44 yo female with PMH of anxiety presents to immediate care due to left ankle/foot injury which occurred last night.  Patient tripped resulting in injury to the ankle/foot.  Today woke up with significant pain and swelling.  Unable to bear weight.  No tingling or numbness.  Did take Tylenol before arrival.  No previous injury to the ankle or foot before      Objective:     Past Medical History:    Anxiety    Arthritis    Visual impairment    glasses/contacts              Past Surgical History:   Procedure Laterality Date          Other      wisdom teeth extraction    Tonsillectomy  2017                No pertinent social history.            Review of Systems   Musculoskeletal:  Positive for gait problem and joint swelling.       Positive for stated complaint: Left ankle pain  Other systems are as noted in HPI.  Constitutional and vital signs reviewed.      All other systems reviewed and negative except as noted above.                  Physical Exam    ED Triage Vitals [06/10/25 1053]   /75   Pulse 93   Resp 16   Temp 98.2 °F (36.8 °C)   Temp src Oral   SpO2 98 %   O2 Device None (Room air)       Current Vitals:   Vital Signs  BP: 133/75  Pulse: 93  Resp: 16  Temp: 98.2 °F (36.8 °C)  Temp src: Oral    Oxygen Therapy  SpO2: 98 %  O2 Device: None (Room air)            Physical Exam  Vitals and nursing note reviewed.   Constitutional:       General: She is not in acute distress.     Appearance: Normal appearance.   HENT:      Head: Atraumatic.   Pulmonary:      Effort: Pulmonary effort is normal.   Musculoskeletal:      Comments: Left knee- no bony tenderness. Left ankle- moderate edema and ecchymosis. Generalized tenderness. Left foot- generalized tenderness but compartments  are soft. Good cap refill and sensation is intact.    Skin:     General: Skin is warm.      Capillary Refill: Capillary refill takes less than 2 seconds.      Findings: Bruising present. No erythema.   Neurological:      General: No focal deficit present.      Mental Status: She is alert and oriented to person, place, and time.                 ED Course  Labs Reviewed - No data to display       XR FOOT, COMPLETE (MIN 3 VIEWS), LEFT (CPT=73630)  Result Date: 6/10/2025  PROCEDURE:  XR FOOT, COMPLETE (MIN 3 VIEWS), LEFT (CPT=73630)  TECHNIQUE:  AP, oblique, and lateral views were obtained.  COMPARISON:  [facility], XR, XR ANKLE (MIN 3 VIEWS), LEFT (CPT=73610), 6/10/2025, 11:14 AM.  INDICATIONS:  Left ankle pain  PATIENT STATED HISTORY: (As transcribed by Technologist)  The patient tripped and fell twice last night and has left lateral ankle and foot pain.    FINDINGS:  Osseous structures are intact. Joint spaces are preserved.  Small marginal osteophytes involve the dorsal aspect of the tarsal bones suggest mild degenerative change.  No dislocation.  Mild inferior calcaneal enthesopathy.  Soft tissue swelling surrounds the ankle.  Correlate clinically.            CONCLUSION:  No acute visible fracture.  See above description.    LOCATION:  [facility]   Dictated by (CST): Astrid Knapp MD on 6/10/2025 at 11:30 AM     Finalized by (CST): Astrid Knapp MD on 6/10/2025 at 11:31 AM       XR ANKLE (MIN 3 VIEWS), LEFT (CPT=73610)  Result Date: 6/10/2025  PROCEDURE:  XR ANKLE (MIN 3 VIEWS), LEFT (CPT=73610)  TECHNIQUE:  Three views were obtained.  COMPARISON:  None.  INDICATIONS:  Left ankle pain  PATIENT STATED HISTORY: (As transcribed by Technologist)  The patient tripped and fell twice last night and has left lateral ankle and foot pain.    FINDINGS:  Osseous structures are intact. Joint spaces are preserved.  Ankle mortise is symmetric.  No dislocation.  Soft tissue swelling.  Mild inferior calcaneal enthesopathy is  noted.            CONCLUSION:  No acute visible fracture.  See above description.    LOCATION:  [facility]   Dictated by (CST): Astrid Knapp MD on 6/10/2025 at 11:28 AM     Finalized by (CST): Astrid Knapp MD on 6/10/2025 at 11:29 AM                         MDM  Ddx-ankle sprain, ankle fracture, foot sprain, foot fracture    On exam the patient is in no acute distress.  left  knee with no bony tenderness, compartments are soft. left ankle exam with moderate edema generalized tenderness . left foot with generalized tenderness including the 5th metatarsal. compartments are soft. neurovascularly intact. independent review of xrays no acute fractures.. discussed these findings with the patient. Clinical exam is consistent with ankle sprain/foot sprain.  Placed in Ace wrap stirrup splint and crutches.  Discussed ibuprofen Tylenol.  Orthopedic follow-up given.        Medical Decision Making  Problems Addressed:  Mild sprain of left ankle, initial encounter: acute illness or injury  Pain in limb: acute illness or injury  Sprain of left foot, initial encounter: acute illness or injury    Amount and/or Complexity of Data Reviewed  Radiology: ordered and independent interpretation performed. Decision-making details documented in ED Course.    Risk  OTC drugs.        Disposition and Plan     Clinical Impression:  1. Mild sprain of left ankle, initial encounter    2. Pain in limb    3. Sprain of left foot, initial encounter         Disposition:  Discharge  6/10/2025 11:47 am    Follow-up:  Magdy Blevins DPM  [id number] [place] [id number]  [phone]                Medications Prescribed:  Current Discharge Medication List                Supplementary Documentation:

## 2025-06-12 NOTE — PROGRESS NOTES
Alejandra Garner is a 45 year old female.  Chief Complaint   Patient presents with    Other     Pt slipped and fell and sprained left ankle and right shoulder     Shoulder Pain     Right shoulder    Ankle Injury     Left ankle       HPI:   Monday night. Got up to go to the bathroom and foot got tangled in sheet and fell, landed on right side. Tried to get up and fell again on the right side.   Left ankle was caught in sheets got twisted.   At that time, got up like it was nothing.   A couple of hours later, the foot was hurting, could hardly walk.   Tuesday morning she went to .   They xrayed her ankle, said it was a sprain.   Was given ibuprofen. 600 mg while she was there.   Taking acetaminophen 500 and alternating with 200 ibuprofen.   Shoulder was hurting, thought it was how she slept, but shoulder is getting worse, constant knot in shoulder, pressure that is always there. Some tingling in right shoulder. No numbness, no shooting.     Was given aircast and Ace wrap and 2 days off of work. She is still in a lot of pain, she spoke with the nurse at work, recommended short term disability.   Was told she can't work with the brace at work.   She works 12-13 hrs shift, 6 days/week.       ALLERGIES:  Allergies   Allergen Reactions    Erythromycin HIVES    Keflex [Cephalexin] HIVES    Morphine OTHER (SEE COMMENTS)    Other SHORTNESS OF BREATH and HIVES    Penicillin G HYPERKALEMIA    Tetracycline HIVES    Ceclor [Cefaclor] HIVES    Penicillins HIVES    Septra [Sulfamethoxazole W/Trimethoprim] HIVES    Ceclor [Cefaclor] NAUSEA ONLY    Sulfamethoxazole UNKNOWN    Trimethoprim UNKNOWN         Current Outpatient Medications   Medication Sig Dispense Refill    ergocalciferol 1.25 MG (45828 UT) Oral Cap Take 1 capsule (50,000 Units total) by mouth once a week.      naproxen 500 MG Oral Tab Take 1 tablet (500 mg total) by mouth 2 (two) times daily with meals for 7 days. 14 tablet 0    acetaminophen 500 MG Oral Tab Take  1 tablet (500 mg total) by mouth every 6 (six) hours as needed for Pain.      FLUoxetine HCl 40 MG Oral Cap Take 1 capsule (40 mg total) by mouth daily. 90 capsule 0      Past Medical History:    Anxiety    Arthritis    Visual impairment    glasses/contacts      Social History:  Social History     Socioeconomic History    Marital status: Single   Tobacco Use    Smoking status: Former     Current packs/day: 0.00     Types: Cigarettes     Quit date: 2012     Years since quittin.1     Passive exposure: Past    Smokeless tobacco: Never   Vaping Use    Vaping status: Never Used   Substance and Sexual Activity    Alcohol use: No     Comment: very rare    Drug use: No   Other Topics Concern    Caffeine Concern No    Exercise Yes    Seat Belt Yes    Special Diet No    Stress Concern No    Weight Concern No   Social History Narrative    ** Merged History Encounter **          Social Drivers of Health     Food Insecurity: No Food Insecurity (2025)    NCSS - Food Insecurity     Worried About Running Out of Food in the Last Year: No     Ran Out of Food in the Last Year: No   Transportation Needs: No Transportation Needs (2025)    NCSS - Transportation     Lack of Transportation: No   Housing Stability: Not At Risk (2025)    NCSS - Housing/Utilities     Has Housing: Yes     Worried About Losing Housing: No     Unable to Get Utilities: No        BP Readings from Last 6 Encounters:   25 130/80   06/10/25 133/75   25 136/80   10/26/24 120/78   24 128/84   23 136/77       Wt Readings from Last 6 Encounters:   06/10/25 139 lb (63 kg)   25 145 lb 6.4 oz (66 kg)   10/26/24 150 lb (68 kg)   24 154 lb 9.6 oz (70.1 kg)   23 160 lb (72.6 kg)   23 160 lb (72.6 kg)       REVIEW OF SYSTEMS:   GENERAL HEALTH: feels well no complaints other than above   SKIN: denies any unusual skin lesions or rashes  RESPIRATORY: denies shortness of breath    CARDIOVASCULAR: denies chest  pain    GI: denies abdominal pain and denies heartburn  NEURO: denies headaches    EXAM:   /80   Pulse 93   Temp 98.2 °F (36.8 °C) (Temporal)   Resp 16   LMP 05/16/2025 (Exact Date)   SpO2 98%  There is no height or weight on file to calculate BMI.      GENERAL: well developed, well nourished,in no apparent distress  SKIN: no rashes,no suspicious lesions  HEENT: atraumatic, normocephalic,   NECK: supple,no adenopathy   LUNGS: clear to auscultation  CARDIO: RRR without murmur  GI: good BS's,no masses, HSM or tenderness  EXTREMITIES: no cyanosis, clubbing or edema  Musc: + left ankle is swollen and bruised and very tender, right shoulder with tenderness in supraspinatus and infraspinatus area. No weakness in rotator cuff or shoulder. Slightly reduced ROM in right shoulder.     ASSESSMENT AND PLAN:     Encounter Diagnoses   Name Primary?    Sprain of left ankle, unspecified ligament, initial encounter Yes    Strain of right shoulder, initial encounter        Diagnoses and all orders for this visit:    Sprain of left ankle, unspecified ligament, initial encounter  -     naproxen 500 MG Oral Tab; Take 1 tablet (500 mg total) by mouth 2 (two) times daily with meals for 7 days.    Strain of right shoulder, initial encounter  -     naproxen 500 MG Oral Tab; Take 1 tablet (500 mg total) by mouth 2 (two) times daily with meals for 7 days.    Rest, ice, elevate, NSAIDS as ordered.   Follow up next week for clearance back. If not better, refer to podiatry.   Can fill out FMLA forms.     No orders of the defined types were placed in this encounter.              Meds & Refills for this Visit:  Requested Prescriptions     Signed Prescriptions Disp Refills    naproxen 500 MG Oral Tab 14 tablet 0     Sig: Take 1 tablet (500 mg total) by mouth 2 (two) times daily with meals for 7 days.             The patient indicates understanding of these issues and agrees to the plan.

## 2025-06-19 NOTE — PROGRESS NOTES
Alejandra Garner is a 45 year old female.  Chief Complaint   Patient presents with    Follow - Up       HPI:   Ankle is better, pain is gone. Can walk normally.   She's been ace wrapping and elevating.   Still some bruising and swelling. Still hurts when she pushes on it.   Feels better when it's wrapped.   She really would like to go back to work tomorrow.     ALLERGIES:  Allergies   Allergen Reactions    Erythromycin HIVES    Keflex [Cephalexin] HIVES    Morphine OTHER (SEE COMMENTS)    Other SHORTNESS OF BREATH and HIVES    Penicillin G HYPERKALEMIA    Tetracycline HIVES    Ceclor [Cefaclor] HIVES    Penicillins HIVES    Septra [Sulfamethoxazole W/Trimethoprim] HIVES    Ceclor [Cefaclor] NAUSEA ONLY    Sulfamethoxazole UNKNOWN    Trimethoprim UNKNOWN         Current Outpatient Medications   Medication Sig Dispense Refill    ergocalciferol 1.25 MG (07965 UT) Oral Cap Take 1 capsule (50,000 Units total) by mouth once a week.      naproxen 500 MG Oral Tab Take 1 tablet (500 mg total) by mouth 2 (two) times daily with meals for 7 days. 14 tablet 0    acetaminophen 500 MG Oral Tab Take 1 tablet (500 mg total) by mouth every 6 (six) hours as needed for Pain.      FLUoxetine HCl 40 MG Oral Cap Take 1 capsule (40 mg total) by mouth daily. 90 capsule 0      Past Medical History:    Anxiety    Arthritis    Visual impairment    glasses/contacts      Social History:  Social History     Socioeconomic History    Marital status: Single   Tobacco Use    Smoking status: Former     Current packs/day: 0.00     Types: Cigarettes     Quit date: 2012     Years since quittin.1     Passive exposure: Past    Smokeless tobacco: Never   Vaping Use    Vaping status: Never Used   Substance and Sexual Activity    Alcohol use: No     Comment: very rare    Drug use: No   Other Topics Concern    Caffeine Concern No    Exercise Yes    Seat Belt Yes    Special Diet No    Stress Concern No    Weight Concern No   Social History  Narrative    ** Merged History Encounter **          Social Drivers of Health     Food Insecurity: No Food Insecurity (4/21/2025)    NCSS - Food Insecurity     Worried About Running Out of Food in the Last Year: No     Ran Out of Food in the Last Year: No   Transportation Needs: No Transportation Needs (4/21/2025)    NCSS - Transportation     Lack of Transportation: No   Housing Stability: Not At Risk (4/21/2025)    NCSS - Housing/Utilities     Has Housing: Yes     Worried About Losing Housing: No     Unable to Get Utilities: No        BP Readings from Last 6 Encounters:   06/19/25 130/76   06/12/25 130/80   06/10/25 133/75   04/21/25 136/80   10/26/24 120/78   09/09/24 128/84       Wt Readings from Last 6 Encounters:   06/19/25 136 lb 9.6 oz (62 kg)   06/10/25 139 lb (63 kg)   04/21/25 145 lb 6.4 oz (66 kg)   10/26/24 150 lb (68 kg)   09/09/24 154 lb 9.6 oz (70.1 kg)   12/21/23 160 lb (72.6 kg)       REVIEW OF SYSTEMS:   GENERAL HEALTH: feels well no complaints other than above   SKIN: denies any unusual skin lesions or rashes  RESPIRATORY: denies shortness of breath   CARDIOVASCULAR: denies chest pain   GI: denies abdominal pain and denies heartburn  NEURO: denies headaches    EXAM:   /76   Pulse 76   Temp 97.3 °F (36.3 °C) (Temporal)   Resp 18   Wt 136 lb 9.6 oz (62 kg)   LMP 06/14/2025 (Exact Date)   SpO2 98%   BMI 22.73 kg/m²  Body mass index is 22.73 kg/m².      GENERAL: well developed, well nourished,in no apparent distress  SKIN: no rashes,no suspicious lesions  HEENT: atraumatic, normocephalic,   NECK: supple,no adenopathy,   LUNGS: clear to auscultation, no distress  CARDIO: RRR without murmur  GI: good BS's,no masses, HSM or tenderness  EXTREMITIES: no cyanosis, clubbing or edema, + left ankle bruised and swollen still. Tenderness over ankle above malleoli.     ASSESSMENT AND PLAN:     Encounter Diagnosis   Name Primary?    Sprain of left ankle, unspecified ligament, initial encounter Yes        Diagnoses and all orders for this visit:    Sprain of left ankle, unspecified ligament, initial encounter    Gettting better.   She would like to go back to work without restrictions, so will clear her.   But, recommend using compression for the first 1-2 weeks, ice at home. Take it easy.   If worsening, let me know.   Forms filled out for FMLA.     No orders of the defined types were placed in this encounter.              Meds & Refills for this Visit:  Requested Prescriptions      No prescriptions requested or ordered in this encounter             The patient indicates understanding of these issues and agrees to the plan.

## 2025-06-20 NOTE — TELEPHONE ENCOUNTER
FMLA forms completed  Spoke with patient who requests office fax forms to [employer]    Forms faxed to [phone]    Sent to scan

## 2025-06-30 NOTE — TELEPHONE ENCOUNTER
Please review. Protocol Failed; No Protocol    Medication(s) to Refill:   Requested Prescriptions     Pending Prescriptions Disp Refills    ERGOCALCIFEROL 1.25 MG (88230 UT) Oral Cap [Pharmacy Med Name: VITAMIN D2 50,000IU (ERGO) CAP RX] 8 capsule 0     Sig: TAKE 1 CAPSULE BY MOUTH 1 TIME A WEEK FOR 8 DOSES         Reason for Medication Refill being sent to Provider / Reason Protocol Failed:  [x] Non-Protocol Medication        Recent Labs:  Lab Results   Component Value Date    VITD 19.1 (L) 04/21/2025     Message sent to patient to complete labs

## 2025-06-30 NOTE — TELEPHONE ENCOUNTER
Last refill:   ergocalciferol 1.25 MG (74812 UT) Oral Cap -- -- 4/22/2025     Last Visit: 6/19/25    Next Visit: No future appointments.      Forward to Dr. Bolanos please advise on refills. Thanks.

## (undated) DEVICE — ENT COBLATOR II, PROCISE XP WAND: Brand: COBLATION

## (undated) DEVICE — SOL  .9 1000ML BTL

## (undated) DEVICE — T & A CDS: Brand: MEDLINE INDUSTRIES, INC.

## (undated) DEVICE — STANDARD HYPODERMIC NEEDLE,POLYPROPYLENE HUB: Brand: MONOJECT

## (undated) DEVICE — GLOVE BIOGEL M SURG SZ 71/2

## (undated) DEVICE — SUTURE CHROMIC GUT 2-0 SH

## (undated) NOTE — LETTER
Date & Time: 12/21/2023, 3:21 PM  Patient: Diann Leslie [facility]  Encounter Provider(s):    Josi Velazquez Vani APRONEIDA       To Whom It May Concern:    Abby العراقي was seen and treated in our department on 12/21/2023. She can return to work 12/23/23.     If you have any questions or concerns, please do not hesitate to call.        _____________________________  Physician/APC Signature

## (undated) NOTE — LETTER
Date: 12/22/2023    Patient Name: Stephani Garner          To Whom it may concern: This letter has been written at the patient's request. The above patient was seen at the [facility] for treatment of a medical condition. The patient may return to work/school on 12/23/23.        Sincerely,    Ten Quispe DO

## (undated) NOTE — IP AVS SNAPSHOT
[facility] [place] [address] [place], [address] ~ [phone]                Discharge Summary   5/4/2017    Jorge Luis Moran           Admission Information        Provider Department    5/4/2017 Dean Rayo MD  Lauren Zuniga / [address] Reny Griffin, ERIKA Sexton, Bianka Jeffrey. GIVE TO PATIENT/FAMILY PRE-OPERATIVELY    PAIN: Patients usually have discomfort in the throat for a period which varies from 1-10 days.  It is not unusual to have the pain become worse during the 4th to 6th d (i.e. hamburgers and fries). As the throat discomfort diminishes, the diet can be increased to mashed potatoes, soft carrots, milk toast, soft boiled and poached eggs.     The regular diet can be gradually resumed except for the 5 P’s- PEANUTS, POPCORN, POT Alcoholic beverages should be avoided while taking narcotics    General Post Op Instructions:  1. Do some nice big deep breathing-   This can prevent pneumonia  2.  Ambulate:   Get up and walk several times a day-not strenuous  Do Foot pumps when you are si Additional Information       We are concerned for your overall well being:    - If you are a smoker or have smoked in the last 12 months, we encourage you to explore options for quitting.     - If you have concerns related to behavioral health issues or th

## (undated) NOTE — LETTER
Date & Time: 6/10/2025, 11:46 AM  Patient: Alejandra Garner  Encounter Provider(s):    Roseann Burnette PA       To Whom It May Concern:    Alejandra Garner was seen and treated in our department on 6/10/2025. She should not return to work until 06/12/25.    If you have any questions or concerns, please do not hesitate to call.        _____________________________  Physician/APC Signature

## (undated) NOTE — LETTER
Date: 8/25/2023    Patient Name: Capo Garner          To Whom it may concern: This letter has been written at the patient's request. The above patient was seen at the [facility] for treatment of a medical condition. The patient may return to work on 8/25/23 with no limitations.          Sincerely,    Monik Neslon, DO

## (undated) NOTE — LETTER
Date: 5/11/2020    Patient Name: Elizabeth Zimmerman          To Whom it may concern: This letter has been written at the patient's request. The above patient was seen at the [facility] for treatment of a medical condition.     This patient s

## (undated) NOTE — LETTER
Date: 8/8/2023    Patient Name: Colten Garner          To Whom it may concern: This letter has been written at the patient's request. The above patient was seen at the [facility] for treatment of a medical condition. This patient should be excused from attending work/school from 8/6/23 through 8/7/23. The patient may return to work/school on 8/8/23 with the following limitations: She should not lift, push, or pull more than 15 lbs for the next 2 week, or until 8/21/23.         Sincerely,    David Blackwell, DO

## (undated) NOTE — LETTER
Date: 8/4/2023    Patient Name: Gia Florian          To Whom it may concern: This letter has been written at the patient's request. The above patient was seen at the [facility] for treatment of a medical condition. This patient should be excused from attending work/school from 8/4/23 through 8/5/23. The patient may return to work/school on 8/6/23.          Sincerely,    Cass Sees, DO

## (undated) NOTE — LETTER
Date: 10/15/2020    Patient Name: Shaun Mason          To Whom it may concern: The above patient was seen at the [facility] for treatment of a medical condition.     The patient may return to work/school on 10/16/20 with the followin

## (undated) NOTE — LETTER
Colten Garner   [facility] [address] [address]  [place] [address]           Dear Tiago Daily     Our records indicate that you have outstanding lab work and or testing that was ordered for you and has not yet been completed:  Lab Frequency Next Occurrence   KAMAR DEBBI 2D+3D SCREENING BILAT (CPT=77067/23866) Once 08/25/2023      To provide you with the best possible care, please complete these orders at your earliest convenience. If you have recently completed these orders please disregard this letter. If you have any questions please call the office at [phone].      Thank you,     [facility]

## (undated) NOTE — LETTER
Date: 8/8/2023    Patient Name: Rashida Garner          To Whom it may concern: This letter has been written at the patient's request. The above patient was seen at the [facility] for treatment of a medical condition. This patient should be excused from attending work/school from 8/6/23 through 8/7/23. The patient may return to work/school on 8/8/23 with the following limitations: She should not lift, push, or pull more than 15 lbs for the next 2 week, or until 8/25/23.         Sincerely,    Jarad Must, DO

## (undated) NOTE — MR AVS SNAPSHOT
After Visit Summary   10/12/2021    Maria De Jesus Montemayor   MRN: [id number]           Visit Information     Date & Time  10/12/2021  3:30 PM Provider  Arabella Santiago  [facility] [facility] Dept.  Phone  [phone] 10/12/2021 (Approximate) 10/13/2022    HPV HIGH RISK , THIN PREP COLLECTION [NVW0011 CUSTOM]  10/12/2021 10/12/2022    LIPID PANEL [3702055 CUSTOM]  10/12/2021 (Approximate) 10/13/2022    KAMAR SCREENING BILAT (CPT=77067) [89977 CPT(R)]  10/12/2021 (Approxim your MyChart login Username and cannot be changed, so think of one that is secure and easy to remember. 6. Create a MyChart password. You can change your password at any time. 7. Choose a Security Question and enter your Answer and click Next.  This can b

## (undated) NOTE — LETTER
Date: 1/13/2021    Patient Name: Sam Wilkerson          To Whom it may concern: This letter has been written at the patient's request. The above patient was seen at the [facility] for treatment of a medical condition.     This patient s

## (undated) NOTE — LETTER
Date: 6/19/2025    Patient Name: Alejandra Garner          To Whom it may concern:    This letter has been written at the patient's request. The above patient was seen at [facility] for treatment of a medical condition.    The patient may return to work/school on 6/20/25 with no limitations.        Sincerely,    Porsha Bolanos, DO